# Patient Record
Sex: MALE | Race: BLACK OR AFRICAN AMERICAN | Employment: STUDENT | URBAN - METROPOLITAN AREA
[De-identification: names, ages, dates, MRNs, and addresses within clinical notes are randomized per-mention and may not be internally consistent; named-entity substitution may affect disease eponyms.]

---

## 2021-04-12 DIAGNOSIS — M25.571 RIGHT ANKLE PAIN, UNSPECIFIED CHRONICITY: Primary | ICD-10-CM

## 2021-04-13 ENCOUNTER — OFFICE VISIT (OUTPATIENT)
Dept: ORTHOPEDIC SURGERY | Age: 26
End: 2021-04-13
Payer: COMMERCIAL

## 2021-04-13 VITALS — BODY MASS INDEX: 22.22 KG/M2 | HEIGHT: 69 IN | WEIGHT: 150 LBS

## 2021-04-13 DIAGNOSIS — S93.491A SPRAIN OF ANTERIOR TALOFIBULAR LIGAMENT OF RIGHT ANKLE, INITIAL ENCOUNTER: Primary | ICD-10-CM

## 2021-04-13 PROCEDURE — 99203 OFFICE O/P NEW LOW 30 MIN: CPT | Performed by: PHYSICIAN ASSISTANT

## 2021-04-13 SDOH — HEALTH STABILITY: MENTAL HEALTH: HOW OFTEN DO YOU HAVE A DRINK CONTAINING ALCOHOL?: NEVER

## 2021-04-13 ASSESSMENT — ENCOUNTER SYMPTOMS
VOMITING: 0
COUGH: 0
SHORTNESS OF BREATH: 0
COLOR CHANGE: 0

## 2021-04-13 NOTE — PROGRESS NOTES
[facility]  [address] [facility] [place]  [facility] [address]  Dept: [phone]    Ambulatory Orthopedic New Patient Visit      CHIEF COMPLAINT:    Chief Complaint   Patient presents with    New Patient     Right ankle        HISTORY OF PRESENT ILLNESS:      The patient is a 22 y.o. male who is being seen  for consultation and evaluation of right ankle pain. Eladio Liriano  presents today for evaluation of right ankle pain present for 2 weeks. The patient has sustained a previous trauma to the affected ankle. Patient notes that approximately 2 weeks ago he was playing indoor soccer and had a inversion ankle injury. Patient was not evaluated for his right ankle injury and treated it conservatively with over-the-counter anti-inflammatories and ice. The patient also purchased a neoprene ankle brace. He notes that he took a few days off of playing soccer and noticed improvement in his right ankle discomfort. Patient then returned to playing soccer and feels as if he cannot make lateral movements due to discomfort along the lateral aspect of the right ankle. Today, mild pain is  noted with ambulation. The pain improves with wearing the right ankle brace and after icing the ankle and worsens with prolonged walking or soccer activities. The patient has not had a previous corticosteroid injection. The patient has not had previous physical therapy for this problem. He denies previous injury, trauma or surgery to the right ankle. He denies numbness and tingling in the right lower extremity. 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 Past Medical History:    History reviewed. No pertinent past medical history. Past Surgical History:    History reviewed. No pertinent surgical history.     Current Medications:   No current outpatient medications on file. No current facility-administered medications for this visit. Allergies:    Patient has no known allergies. Social History:   Social History     Socioeconomic History    Marital status: Single     Spouse name: Not on file    Number of children: Not on file    Years of education: Not on file    Highest education level: Not on file   Occupational History    Not on file   Social Needs    Financial resource strain: Not on file    Food insecurity     Worry: Not on file     Inability: Not on file    Transportation needs     Medical: Not on file     Non-medical: Not on file   Tobacco Use    Smoking status: Never Smoker    Smokeless tobacco: Never Used   Substance and Sexual Activity    Alcohol use: Never     Frequency: Never    Drug use: Never    Sexual activity: Not on file   Lifestyle    Physical activity     Days per week: Not on file     Minutes per session: Not on file    Stress: Not on file   Relationships    Social connections     Talks on phone: Not on file     Gets together: Not on file     Attends Congregation service: Not on file     Active member of club or organization: Not on file     Attends meetings of clubs or organizations: Not on file     Relationship status: Not on file    Intimate partner violence     Fear of current or ex partner: Not on file     Emotionally abused: Not on file     Physically abused: Not on file     Forced sexual activity: Not on file   Other Topics Concern    Not on file   Social History Narrative    Not on file       Family History:  History reviewed. No pertinent family history. REVIEW OF SYSTEMS:  Review of Systems   Constitutional: Negative for activity change and fever. HENT: Negative for sneezing. Respiratory: Negative for cough and shortness of breath. Cardiovascular: Negative for chest pain. Gastrointestinal: Negative for vomiting.    Musculoskeletal: Positive for arthralgias (right ankle) and joint swelling (right ankle). Negative for myalgias. Skin: Negative for color change. Neurological: Negative for weakness and numbness. Psychiatric/Behavioral: Negative for sleep disturbance. PHYSICAL EXAM:  Ht 5' 9\" (1.753 m)   Wt 150 lb (68 kg)   BMI 22.15 kg/m²  Body mass index is 22.15 kg/m². Physical Exam  Gen: alert and oriented  Psych:  Appropriate affect; Appropriate knowledge base; Appropriate mood; No hallucinations; Head: normocephalic, atraumatic   Chest: symmetric chest excursion  Pelvis: stable with ambulation  Ortho Exam  Extremity: Patient ambulates independently and arrived wearing a neoprene ankle brace on the right lower extremity. After removal of the brace evaluation of the right ankle reveals mild diffuse swelling along the lateral aspect of the right ankle. There is no erythema, ecchymosis, skin lesions or signs of infection noted to the right lower extremity. Patient has full range of motion of the right ankle with discomfort noted in full dorsiflexion and inversion. Mild tenderness palpation noted along the distal fibula and ATFL. Negative anterior drawer, negative talar tilt right ankle. Patient has full range of motion of the right knee without discomfort appreciated. Sensation is intact to light touch of the right lower extremity without focal deficits present. The skin is noted to be warm with brisk capillary refill distally. DP pulse 2+ on the right. Radiology:    Xr Ankle Right (min 3 Views)    Result Date: 4/13/2021  History: Right ankle pain Findings: AP, lateral, mortise view x-rays of the right ankle done in the office today reveals no evidence of fracture, subluxation, dislocation or radiopaque foreign body, radiopaque tumor. Impression: Normal right ankle x-rays as described above. ASSESSMENT:     1.  Sprain of anterior talofibular ligament of right ankle, initial encounter         PLAN:     Today in office we discussed etiology and natural history of right ankle sprain. I personally reviewed the patient's x-rays from today revealing normal right ankle x-rays without acute osseous abnormality or fracture. The treatment options may include activity modification, oral anti-inflammatories, bracing and/or physical therapy. The patient would like to proceed with:  1.  A lace up brace for the right ankle. 2.  Home exercise program to encourage strengthening of the right ankle. 3.  Continue to ice, elevate and limit soccer activity over the next 2 weeks. Continue over-the-counter ibuprofen and/or Tylenol for right ankle discomfort. The patient will follow up in 2 weeks, or sooner if needed. We discussed that the patient should call us with any questions or concerns. The patient voiced his understanding. Return in about 2 weeks (around 4/27/2021) for re-evaluation. No orders of the defined types were placed in this encounter. No orders of the defined types were placed in this encounter. This note is created with the assistance of a speech recognition program.  While intending to generate a document that actually reflects the content of the visit, the document can still have some errors including those of syntax and sound a like substitutions which may escape proof reading. In such instances, actual meaning can be extrapolated by contextual diversion.      Electronically signed by Wendy Licona PA-C 4/13/2021 at 11:21 PM